# Patient Record
Sex: FEMALE | Race: WHITE | NOT HISPANIC OR LATINO | URBAN - METROPOLITAN AREA
[De-identification: names, ages, dates, MRNs, and addresses within clinical notes are randomized per-mention and may not be internally consistent; named-entity substitution may affect disease eponyms.]

---

## 2022-06-22 ENCOUNTER — OFFICE (OUTPATIENT)
Dept: URBAN - METROPOLITAN AREA CLINIC 105 | Facility: CLINIC | Age: 57
End: 2022-06-22
Payer: COMMERCIAL

## 2022-06-22 VITALS
OXYGEN SATURATION: 95 % | DIASTOLIC BLOOD PRESSURE: 86 MMHG | WEIGHT: 171 LBS | SYSTOLIC BLOOD PRESSURE: 128 MMHG | HEART RATE: 90 BPM | HEIGHT: 64 IN

## 2022-06-22 DIAGNOSIS — K58.1 IRRITABLE BOWEL SYNDROME WITH CONSTIPATION: ICD-10-CM

## 2022-06-22 DIAGNOSIS — K92.1 MELENA: ICD-10-CM

## 2022-06-22 DIAGNOSIS — Z12.11 ENCOUNTER FOR SCREENING FOR MALIGNANT NEOPLASM OF COLON: ICD-10-CM

## 2022-06-22 DIAGNOSIS — K21.9 GASTRO-ESOPHAGEAL REFLUX DISEASE WITHOUT ESOPHAGITIS: ICD-10-CM

## 2022-06-22 DIAGNOSIS — K80.20 CALCULUS OF GALLBLADDER WITHOUT CHOLECYSTITIS WITHOUT OBSTRU: ICD-10-CM

## 2022-06-22 PROCEDURE — 99204 OFFICE O/P NEW MOD 45 MIN: CPT

## 2022-06-22 RX ORDER — OMEPRAZOLE 40 MG/1
40 CAPSULE, DELAYED RELEASE ORAL
Qty: 60 | Refills: 1 | Status: COMPLETED
Start: 2022-06-22 | End: 2022-10-21

## 2022-06-22 RX ORDER — SODIUM PICOSULFATE, MAGNESIUM OXIDE, AND ANHYDROUS CITRIC ACID 10; 3.5; 12 MG/160ML; G/160ML; G/160ML
LIQUID ORAL
Qty: 1 | Refills: 0 | Status: COMPLETED
Start: 2022-06-22 | End: 2022-10-21

## 2022-06-22 NOTE — SERVICENOTES
-ordered upper endoscopy and colonoscopy
-discussed low FODMAP diet for bloating
-try fiber supplement twice daily
-try Miralax (polyethylene glycol) daily for constipation, adjust dose for 1-2 soft BMs daily
-sent in prescription for omeprazole 40 mg daily, 30-60 minutes before breakfast for 8 weeks for reflux
-continue to make efforts to lose weight
-if you continue to have abdominal pain, follow-up with Dr. Dash for gallbladder removal

## 2022-06-22 NOTE — SERVICEHPINOTES
Khalida Cabral   is seen for an initial visit today.  Patient presents for evaluation for bloody stool, abdominal pain, constipation and colon cancer screening.  She had an episode of right-sided flank pain that prompted a CT scan that showed a 24 mm gallstone, mild right hydroureteronephrosis, multiple right-sided kidney stones, diverticulosis and mild hiatal hernia.  She reports having blood in stool when she is constipated.  She is concerned she may have hemorrhoids.  She has had a handful episodes of blood in stool.  She was 1st diagnosed with kidney stones 10/2021 and was treated with Flomax and Toradol.  She had a bad episode 02/2022 which prompted her to go to the emergency room.  She had a kidney stone removed and had lithotripsy with a urologist.  She saw Dr. Dash for gallstones.  He suggested that she remove her gallbladder but she opted to wait at this time.  Occasionally she has epigastric pain.  Also with pain radiating into her shoulder blades and right shoulder.  She reports having abdominal discomfort and bloating.  Also with associated heartburn.  She does not take anything for heartburn.  She has not taken anything for constipation other than prunes.  She has changed her eating habits and has lost 11 lbs with phentermine.  She is eating less fatty foods as well.

## 2022-07-19 ENCOUNTER — AMBULATORY SURGICAL CENTER (OUTPATIENT)
Dept: URBAN - METROPOLITAN AREA SURGERY 26 | Facility: SURGERY | Age: 57
End: 2022-07-19
Payer: COMMERCIAL

## 2022-07-19 ENCOUNTER — OFFICE (OUTPATIENT)
Dept: URBAN - METROPOLITAN AREA PATHOLOGY 24 | Facility: PATHOLOGY | Age: 57
End: 2022-07-19
Payer: COMMERCIAL

## 2022-07-19 DIAGNOSIS — Z12.11 ENCOUNTER FOR SCREENING FOR MALIGNANT NEOPLASM OF COLON: ICD-10-CM

## 2022-07-19 DIAGNOSIS — D12.2 BENIGN NEOPLASM OF ASCENDING COLON: ICD-10-CM

## 2022-07-19 DIAGNOSIS — K29.50 UNSPECIFIED CHRONIC GASTRITIS WITHOUT BLEEDING: ICD-10-CM

## 2022-07-19 DIAGNOSIS — D12.3 BENIGN NEOPLASM OF TRANSVERSE COLON: ICD-10-CM

## 2022-07-19 DIAGNOSIS — D12.7 BENIGN NEOPLASM OF RECTOSIGMOID JUNCTION: ICD-10-CM

## 2022-07-19 DIAGNOSIS — D12.5 BENIGN NEOPLASM OF SIGMOID COLON: ICD-10-CM

## 2022-07-19 DIAGNOSIS — K63.5 POLYP OF COLON: ICD-10-CM

## 2022-07-19 DIAGNOSIS — R10.11 RIGHT UPPER QUADRANT PAIN: ICD-10-CM

## 2022-07-19 PROCEDURE — 88342 IMHCHEM/IMCYTCHM 1ST ANTB: CPT | Mod: 59 | Performed by: STUDENT IN AN ORGANIZED HEALTH CARE EDUCATION/TRAINING PROGRAM

## 2022-07-19 PROCEDURE — 88305 TISSUE EXAM BY PATHOLOGIST: CPT | Performed by: STUDENT IN AN ORGANIZED HEALTH CARE EDUCATION/TRAINING PROGRAM

## 2022-07-19 PROCEDURE — 45385 COLONOSCOPY W/LESION REMOVAL: CPT | Mod: 33

## 2022-07-19 PROCEDURE — 45380 COLONOSCOPY AND BIOPSY: CPT | Mod: 59

## 2022-07-19 PROCEDURE — 88313 SPECIAL STAINS GROUP 2: CPT | Performed by: STUDENT IN AN ORGANIZED HEALTH CARE EDUCATION/TRAINING PROGRAM

## 2022-07-19 PROCEDURE — 43239 EGD BIOPSY SINGLE/MULTIPLE: CPT | Mod: 51

## 2022-10-21 ENCOUNTER — OFFICE (OUTPATIENT)
Dept: URBAN - METROPOLITAN AREA CLINIC 105 | Facility: CLINIC | Age: 57
End: 2022-10-21
Payer: COMMERCIAL

## 2022-10-21 VITALS
OXYGEN SATURATION: 99 % | HEART RATE: 87 BPM | DIASTOLIC BLOOD PRESSURE: 94 MMHG | WEIGHT: 174 LBS | HEIGHT: 64 IN | SYSTOLIC BLOOD PRESSURE: 130 MMHG

## 2022-10-21 DIAGNOSIS — D12.7 BENIGN NEOPLASM OF RECTOSIGMOID JUNCTION: ICD-10-CM

## 2022-10-21 DIAGNOSIS — K21.9 GASTRO-ESOPHAGEAL REFLUX DISEASE WITHOUT ESOPHAGITIS: ICD-10-CM

## 2022-10-21 DIAGNOSIS — Z90.49 ACQUIRED ABSENCE OF OTHER SPECIFIED PARTS OF DIGESTIVE TRACT: ICD-10-CM

## 2022-10-21 DIAGNOSIS — D12.6 BENIGN NEOPLASM OF COLON, UNSPECIFIED: ICD-10-CM

## 2022-10-21 DIAGNOSIS — Z83.71 FAMILY HISTORY OF COLONIC POLYPS: ICD-10-CM

## 2022-10-21 DIAGNOSIS — K58.1 IRRITABLE BOWEL SYNDROME WITH CONSTIPATION: ICD-10-CM

## 2022-10-21 PROCEDURE — 99213 OFFICE O/P EST LOW 20 MIN: CPT

## 2022-10-21 RX ORDER — FAMOTIDINE 40 MG/1
TABLET, FILM COATED ORAL
Qty: 30 | Refills: 5 | Status: ACTIVE
Start: 2022-10-21

## 2022-10-21 RX ORDER — OMEPRAZOLE 40 MG/1
40 CAPSULE, DELAYED RELEASE ORAL
Qty: 60 | Refills: 1 | Status: COMPLETED
Start: 2022-06-22 | End: 2022-10-21

## 2022-10-21 NOTE — SERVICENOTES
– Stop omeprazole and take famotidine 40 mg daily as needed for reflux
– Continue to avoid trigger foods for reflux
– Continue to make efforts to lose weight
– Continue Citrucel as needed to maintain bowel regularity, but hold this if you continue to have loose stools
– Ordered genetic testing due to advanced colon polyp
– Repeat colonoscopy 7/2025 for colon polyp surveillance
– We will obtain records from your gallbladder surgery
– Follow-up as needed, let us know if you have worsening symptoms in the meantime

## 2022-10-21 NOTE — SERVICEHPINOTES
Khalida Cabral   is seen today for a follow-up visit.   Patient presents for follow-up of GERD, colon polyps, abdominal pain and constipation. Patient was seen in clinic 6/22/2022 for evaluation of bloody stool, abdominal pain, constipation and colon cancer screening. She was advised to try low FODMAP diet for bloating. She was also advised to try fiber supplement twice daily and MiraLAX for constipation. A prescription was sent in for omeprazole 40 mg daily. Patient had EGD 7/19/2022 with normal esophagus, 2 cm hiatal hernia, normal stomach (bxs w/ mild chronic gastritis, neg for H. pylori or GIM) and normal duodenum (bxs wnl, neg for celiac disease). She was advised to take omeprazole 40 mg daily x8 weeks and to avoid NSAIDs. She also had a colonoscopy 7/19/2022 with 6 polyps removed including a large 25 mm polyp in the rectosigmoid colon (3 TAs, 2 SSAs, large TVA w/ focus of HGD and 1 HP), diverticulosis and internal hemorrhoids. She was advised to repeat her colonoscopy in 3 years.   on 9/6/2022.
br
brPatient states that she is feeling good. She has not having bad reflux and is avoiding dietary triggers. She does not see much difference that she takes omeprazole or not. She has not had too many issues with constipation. She is taking Citrucel for fiber supplements. She has been having mostly loose stools after her gallbladder surgery.